# Patient Record
Sex: MALE | Race: WHITE | NOT HISPANIC OR LATINO
[De-identification: names, ages, dates, MRNs, and addresses within clinical notes are randomized per-mention and may not be internally consistent; named-entity substitution may affect disease eponyms.]

---

## 2018-11-28 ENCOUNTER — APPOINTMENT (OUTPATIENT)
Dept: GASTROENTEROLOGY | Facility: CLINIC | Age: 73
End: 2018-11-28
Payer: MEDICARE

## 2018-11-28 VITALS
DIASTOLIC BLOOD PRESSURE: 80 MMHG | TEMPERATURE: 98.5 F | HEART RATE: 68 BPM | WEIGHT: 220 LBS | OXYGEN SATURATION: 97 % | HEIGHT: 72 IN | BODY MASS INDEX: 29.8 KG/M2 | SYSTOLIC BLOOD PRESSURE: 160 MMHG

## 2018-11-28 DIAGNOSIS — Z86.79 PERSONAL HISTORY OF OTHER DISEASES OF THE CIRCULATORY SYSTEM: ICD-10-CM

## 2018-11-28 DIAGNOSIS — Z78.9 OTHER SPECIFIED HEALTH STATUS: ICD-10-CM

## 2018-11-28 DIAGNOSIS — Z82.49 FAMILY HISTORY OF ISCHEMIC HEART DISEASE AND OTHER DISEASES OF THE CIRCULATORY SYSTEM: ICD-10-CM

## 2018-11-28 DIAGNOSIS — Z12.11 ENCOUNTER FOR SCREENING FOR MALIGNANT NEOPLASM OF COLON: ICD-10-CM

## 2018-11-28 DIAGNOSIS — Z80.9 FAMILY HISTORY OF MALIGNANT NEOPLASM, UNSPECIFIED: ICD-10-CM

## 2018-11-28 PROCEDURE — 99204 OFFICE O/P NEW MOD 45 MIN: CPT

## 2018-11-28 RX ORDER — LISINOPRIL 40 MG/1
40 TABLET ORAL
Refills: 0 | Status: ACTIVE | COMMUNITY

## 2018-11-28 RX ORDER — AMLODIPINE BESYLATE 10 MG/1
10 TABLET ORAL
Refills: 0 | Status: ACTIVE | COMMUNITY

## 2018-12-03 ENCOUNTER — FORM ENCOUNTER (OUTPATIENT)
Age: 73
End: 2018-12-03

## 2018-12-04 ENCOUNTER — OUTPATIENT (OUTPATIENT)
Dept: OUTPATIENT SERVICES | Facility: HOSPITAL | Age: 73
LOS: 1 days | End: 2018-12-04
Payer: MEDICARE

## 2018-12-04 ENCOUNTER — APPOINTMENT (OUTPATIENT)
Dept: ULTRASOUND IMAGING | Facility: CLINIC | Age: 73
End: 2018-12-04
Payer: MEDICARE

## 2018-12-04 DIAGNOSIS — Z00.8 ENCOUNTER FOR OTHER GENERAL EXAMINATION: ICD-10-CM

## 2018-12-04 DIAGNOSIS — E83.119 HEMOCHROMATOSIS, UNSPECIFIED: ICD-10-CM

## 2018-12-04 PROCEDURE — 76700 US EXAM ABDOM COMPLETE: CPT | Mod: 26

## 2018-12-04 PROCEDURE — 76700 US EXAM ABDOM COMPLETE: CPT

## 2018-12-12 LAB — HEMOCCULT STL QL IA: NEGATIVE

## 2019-01-10 ENCOUNTER — APPOINTMENT (OUTPATIENT)
Dept: GASTROENTEROLOGY | Facility: AMBULATORY MEDICAL SERVICES | Age: 74
End: 2019-01-10
Payer: MEDICARE

## 2019-01-10 PROCEDURE — 45385 COLONOSCOPY W/LESION REMOVAL: CPT | Mod: PT

## 2019-02-11 ENCOUNTER — APPOINTMENT (OUTPATIENT)
Dept: GASTROENTEROLOGY | Facility: CLINIC | Age: 74
End: 2019-02-11
Payer: MEDICARE

## 2019-02-11 VITALS
BODY MASS INDEX: 30.07 KG/M2 | HEIGHT: 72 IN | DIASTOLIC BLOOD PRESSURE: 85 MMHG | SYSTOLIC BLOOD PRESSURE: 125 MMHG | OXYGEN SATURATION: 98 % | HEART RATE: 70 BPM | TEMPERATURE: 96.2 F | WEIGHT: 222 LBS

## 2019-02-11 DIAGNOSIS — Z09 ENCOUNTER FOR FOLLOW-UP EXAMINATION AFTER COMPLETED TREATMENT FOR CONDITIONS OTHER THAN MALIGNANT NEOPLASM: ICD-10-CM

## 2019-02-11 PROCEDURE — 99213 OFFICE O/P EST LOW 20 MIN: CPT

## 2019-02-11 NOTE — ASSESSMENT
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 He is currently asymptomatic. He will be seen in one year for followup visit. His next colonoscopy will be in 5 years

## 2019-02-11 NOTE — HISTORY OF PRESENT ILLNESS
[FreeTextEntry1] : Patient had a recent colonoscopy and polypectomy. The polyp in the transverse colon was a tubular adenoma. He also had diverticulosis in the sigmoid and left colon.\par The patient is currently asymptomatic from a GI standpoint.

## 2019-09-02 PROBLEM — Z09 FOLLOW UP: Status: ACTIVE | Noted: 2019-02-11

## 2020-03-16 ENCOUNTER — APPOINTMENT (OUTPATIENT)
Dept: GASTROENTEROLOGY | Facility: CLINIC | Age: 75
End: 2020-03-16

## 2020-07-29 ENCOUNTER — APPOINTMENT (OUTPATIENT)
Dept: GASTROENTEROLOGY | Facility: CLINIC | Age: 75
End: 2020-07-29
Payer: MEDICARE

## 2020-07-29 VITALS
OXYGEN SATURATION: 98 % | WEIGHT: 218 LBS | TEMPERATURE: 98.2 F | SYSTOLIC BLOOD PRESSURE: 140 MMHG | HEART RATE: 71 BPM | HEIGHT: 72 IN | BODY MASS INDEX: 29.53 KG/M2 | DIASTOLIC BLOOD PRESSURE: 80 MMHG

## 2020-07-29 PROCEDURE — 99213 OFFICE O/P EST LOW 20 MIN: CPT

## 2020-07-29 NOTE — PHYSICAL EXAM
[General Appearance - Alert] : alert [General Appearance - In No Acute Distress] : in no acute distress [Sclera] : the sclera and conjunctiva were normal [Extraocular Movements] : extraocular movements were intact [PERRL With Normal Accommodation] : pupils were equal in size, round, and reactive to light [Outer Ear] : the ears and nose were normal in appearance [Oropharynx] : the oropharynx was normal [Neck Appearance] : the appearance of the neck was normal [Thyroid Diffuse Enlargement] : the thyroid was not enlarged [Jugular Venous Distention Increased] : there was no jugular-venous distention [Neck Cervical Mass (___cm)] : no neck mass was observed [Thyroid Nodule] : there were no palpable thyroid nodules [Auscultation Breath Sounds / Voice Sounds] : lungs were clear to auscultation bilaterally [Heart Rate And Rhythm] : heart rate was normal and rhythm regular [Heart Sounds] : normal S1 and S2 [Heart Sounds Gallop] : no gallops [Murmurs] : no murmurs [Heart Sounds Pericardial Friction Rub] : no pericardial rub [Abdomen Soft] : soft [Bowel Sounds] : normal bowel sounds [Abdomen Tenderness] : non-tender [] : no hepato-splenomegaly [Abdomen Mass (___ Cm)] : no abdominal mass palpated [No Spinal Tenderness] : no spinal tenderness [No CVA Tenderness] : no ~M costovertebral angle tenderness [Abnormal Walk] : normal gait [Motor Tone] : muscle strength and tone were normal [Musculoskeletal - Swelling] : no joint swelling seen [Nail Clubbing] : no clubbing  or cyanosis of the fingernails [Oriented To Time, Place, And Person] : oriented to person, place, and time [Impaired Insight] : insight and judgment were intact [Affect] : the affect was normal

## 2020-07-29 NOTE — ASSESSMENT
[FreeTextEntry1] : Patient with history of tubular adenoma removed from the colon.  His bowel movements are regular.  He has no upper gastrointestinal symptoms.  FOBT will be sent to the lab.

## 2020-07-29 NOTE — HISTORY OF PRESENT ILLNESS
[FreeTextEntry1] : Patient is a 75 y/o male who had a  colonoscopy and polypectomy 1/2019. The polyp in the transverse colon was a tubular adenoma. He also had diverticulosis in the sigmoid and left colon.\par The patient is currently asymptomatic from a GI standpoint.

## 2020-08-04 LAB — HEMOCCULT STL QL IA: NEGATIVE

## 2020-12-16 PROBLEM — Z12.11 ENCOUNTER FOR SCREENING COLONOSCOPY: Status: RESOLVED | Noted: 2018-11-28 | Resolved: 2020-12-16

## 2021-12-22 ENCOUNTER — APPOINTMENT (OUTPATIENT)
Dept: GASTROENTEROLOGY | Facility: CLINIC | Age: 76
End: 2021-12-22
Payer: MEDICARE

## 2021-12-22 VITALS
DIASTOLIC BLOOD PRESSURE: 90 MMHG | WEIGHT: 218 LBS | BODY MASS INDEX: 29.53 KG/M2 | TEMPERATURE: 97.3 F | HEART RATE: 59 BPM | SYSTOLIC BLOOD PRESSURE: 183 MMHG | HEIGHT: 72 IN | OXYGEN SATURATION: 96 %

## 2021-12-22 PROCEDURE — 99214 OFFICE O/P EST MOD 30 MIN: CPT

## 2021-12-22 NOTE — REVIEW OF SYSTEMS
[As Noted in HPI] : as noted in HPI [Negative] : Endocrine [de-identified] : Hx of hemochromatosis and polycythemia

## 2021-12-22 NOTE — ASSESSMENT
[FreeTextEntry1] : Patient with history of CAD and polycythemia and hemochromatosis.  He does have phlebotomies every 6 months.\par \par Patient has a history of a tubular adenoma of the colon removed 2 years ago.  He did have a bout of flatulence and some irregular bowel movements 3 weeks ago that was self-limiting.  The symptoms have resolved and he is back to his baseline bowel movements.  He has no abdominal pain.  FOBT will be sent to the lab.\par \par If he continues to do well, we will see him in follow-up in 1 year.

## 2021-12-22 NOTE — HISTORY OF PRESENT ILLNESS
[FreeTextEntry1] : Patient is a 76 y/o male who had a  colonoscopy and polypectomy 1/2019. The polyp in the transverse colon was a tubular adenoma. He also had diverticulosis in the sigmoid and left colon.\par About 3 weeks ago patient had a bout of flatulence and passing much flatus.  This has now resolved and patient is back to his baseline with normal bowel movements.\par He does have a history of hemochromatosis and goes for phlebotomies every 6 months.

## 2021-12-28 ENCOUNTER — NON-APPOINTMENT (OUTPATIENT)
Age: 76
End: 2021-12-28

## 2021-12-28 LAB — HEMOCCULT STL QL IA: NEGATIVE

## 2023-09-08 ENCOUNTER — OFFICE (OUTPATIENT)
Dept: URBAN - METROPOLITAN AREA CLINIC 27 | Facility: CLINIC | Age: 78
Setting detail: OPHTHALMOLOGY
End: 2023-09-08
Payer: MEDICARE

## 2023-09-08 DIAGNOSIS — H02.831: ICD-10-CM

## 2023-09-08 DIAGNOSIS — H43.811: ICD-10-CM

## 2023-09-08 DIAGNOSIS — H25.13: ICD-10-CM

## 2023-09-08 DIAGNOSIS — H01.001: ICD-10-CM

## 2023-09-08 DIAGNOSIS — H52.4: ICD-10-CM

## 2023-09-08 DIAGNOSIS — E11.9: ICD-10-CM

## 2023-09-08 DIAGNOSIS — H52.7: ICD-10-CM

## 2023-09-08 PROBLEM — H10.45 ALLERGIC CONJUNCTIVITIS: Status: ACTIVE | Noted: 2023-09-08

## 2023-09-08 PROCEDURE — 92015 DETERMINE REFRACTIVE STATE: CPT | Performed by: OPHTHALMOLOGY

## 2023-09-08 PROCEDURE — 92014 COMPRE OPH EXAM EST PT 1/>: CPT | Performed by: OPHTHALMOLOGY

## 2023-09-08 ASSESSMENT — LID POSITION - DERMATOCHALASIS
OS_DERMATOCHALASIS: LUL 2+
OD_DERMATOCHALASIS: RUL 2+ 3+

## 2023-09-08 ASSESSMENT — REFRACTION_CURRENTRX
OD_SPHERE: +2.75
OS_AXIS: 003
OS_OVR_VA: 20/
OS_VPRISM_DIRECTION: SV
OD_OVR_VA: 20/
OD_VPRISM_DIRECTION: SV
OD_VPRISM_DIRECTION: SV
OD_SPHERE: +1.00
OS_OVR_VA: 20/
OD_OVR_VA: 20/
OS_CYLINDER: +0.75
OS_SPHERE: +2.75
OD_AXIS: 008
OS_SPHERE: +0.75
OS_VPRISM_DIRECTION: SV
OD_CYLINDER: +0.75

## 2023-09-08 ASSESSMENT — REFRACTION_MANIFEST
OD_AXIS: 180
OS_AXIS: 180
OD_AXIS: 180
OD_ADD: +2.50
OD_ADD: +2.50
OS_SPHERE: +0.75
OS_CYLINDER: +0.50
OS_SPHERE: +0.75
OS_ADD: +2.50
OS_ADD: +2.50
OS_AXIS: 015
OD_VA1: 20/20
OS_CYLINDER: +0.50
OD_CYLINDER: +0.75
OD_AXIS: 175
OD_ADD: +2.50
OD_VA1: 20/20
OS_AXIS: 005
OS_VA1: 20/20
OD_CYLINDER: +0.50
OS_VA1: 20/20
OS_SPHERE: +0.75
OD_VA1: 20/20
OS_CYLINDER: +0.75
OD_CYLINDER: +0.75
OD_SPHERE: +1.00
OS_ADD: +2.50
OD_SPHERE: +0.75
OS_VA1: 20/20
OD_SPHERE: +1.00

## 2023-09-08 ASSESSMENT — KERATOMETRY
OS_AXISANGLE_DEGREES: 180
METHOD_AUTO_MANUAL: AUTO
OS_K2POWER_DIOPTERS: 43.00
OD_AXISANGLE_DEGREES: 171
OD_K2POWER_DIOPTERS: 42.25
OD_K1POWER_DIOPTERS: 42.00
OS_K1POWER_DIOPTERS: 42.00

## 2023-09-08 ASSESSMENT — LID EXAM ASSESSMENTS
OS_BLEPHARITIS: LLL LUL 2+
OD_BLEPHARITIS: RLL RUL 2+
OD_MEIBOMITIS: RUL 2+
OS_MEIBOMITIS: LUL 2+

## 2023-09-08 ASSESSMENT — SPHEQUIV_DERIVED
OD_SPHEQUIV: 1.375
OS_SPHEQUIV: 1
OD_SPHEQUIV: 1
OD_SPHEQUIV: 1.375
OS_SPHEQUIV: 1.375
OS_SPHEQUIV: 1.125
OD_SPHEQUIV: 1.375
OS_SPHEQUIV: 1

## 2023-09-08 ASSESSMENT — VISUAL ACUITY
OD_BCVA: 20/30
OS_BCVA: 20/20-2

## 2023-09-08 ASSESSMENT — AXIALLENGTH_DERIVED
OS_AL: 23.4247
OS_AL: 23.5211
OD_AL: 23.5613
OD_AL: 23.7079
OS_AL: 23.5696
OD_AL: 23.5613
OS_AL: 23.5696
OD_AL: 23.5613

## 2023-09-08 ASSESSMENT — REFRACTION_AUTOREFRACTION
OS_CYLINDER: +0.75
OD_AXIS: 178
OS_AXIS: 177
OS_SPHERE: +1.00
OD_SPHERE: +1.00
OD_CYLINDER: +0.75

## 2023-09-08 ASSESSMENT — TONOMETRY
OD_IOP_MMHG: 12
OS_IOP_MMHG: 12

## 2023-09-08 ASSESSMENT — CONFRONTATIONAL VISUAL FIELD TEST (CVF)
OS_FINDINGS: FULL
OD_FINDINGS: FULL

## 2023-09-20 ENCOUNTER — APPOINTMENT (OUTPATIENT)
Dept: GASTROENTEROLOGY | Facility: CLINIC | Age: 78
End: 2023-09-20
Payer: MEDICARE

## 2023-09-20 VITALS
DIASTOLIC BLOOD PRESSURE: 73 MMHG | HEIGHT: 72 IN | BODY MASS INDEX: 29.12 KG/M2 | TEMPERATURE: 98 F | WEIGHT: 215 LBS | SYSTOLIC BLOOD PRESSURE: 182 MMHG | HEART RATE: 53 BPM | OXYGEN SATURATION: 96 %

## 2023-09-20 DIAGNOSIS — I25.10 ATHEROSCLEROTIC HEART DISEASE OF NATIVE CORONARY ARTERY W/OUT ANGINA PECTORIS: ICD-10-CM

## 2023-09-20 DIAGNOSIS — D75.1 SECONDARY POLYCYTHEMIA: ICD-10-CM

## 2023-09-20 PROCEDURE — 99214 OFFICE O/P EST MOD 30 MIN: CPT

## 2023-09-20 RX ORDER — METOPROLOL SUCCINATE 50 MG/1
50 TABLET, EXTENDED RELEASE ORAL
Refills: 0 | Status: ACTIVE | COMMUNITY

## 2023-09-20 RX ORDER — METFORMIN HYDROCHLORIDE 500 MG/1
500 TABLET, COATED ORAL
Refills: 0 | Status: DISCONTINUED | COMMUNITY
End: 2023-09-20

## 2023-12-08 RX ORDER — ASPIRIN 325 MG/1
325 TABLET, FILM COATED ORAL
Refills: 0 | Status: DISCONTINUED | COMMUNITY
End: 2023-12-08

## 2024-02-22 ENCOUNTER — APPOINTMENT (OUTPATIENT)
Dept: GASTROENTEROLOGY | Facility: AMBULATORY MEDICAL SERVICES | Age: 79
End: 2024-02-22
Payer: MEDICARE

## 2024-02-22 PROCEDURE — 45385 COLONOSCOPY W/LESION REMOVAL: CPT | Mod: PT

## 2024-03-11 ENCOUNTER — APPOINTMENT (OUTPATIENT)
Dept: GASTROENTEROLOGY | Facility: CLINIC | Age: 79
End: 2024-03-11
Payer: MEDICARE

## 2024-03-11 VITALS
BODY MASS INDEX: 30.44 KG/M2 | TEMPERATURE: 97.3 F | WEIGHT: 215 LBS | HEART RATE: 57 BPM | HEIGHT: 70.5 IN | OXYGEN SATURATION: 96 % | DIASTOLIC BLOOD PRESSURE: 82 MMHG | SYSTOLIC BLOOD PRESSURE: 166 MMHG

## 2024-03-11 DIAGNOSIS — D12.6 BENIGN NEOPLASM OF COLON, UNSPECIFIED: ICD-10-CM

## 2024-03-11 DIAGNOSIS — Z86.010 PERSONAL HISTORY OF COLONIC POLYPS: ICD-10-CM

## 2024-03-11 DIAGNOSIS — E83.119 HEMOCHROMATOSIS, UNSPECIFIED: ICD-10-CM

## 2024-03-11 PROCEDURE — 99213 OFFICE O/P EST LOW 20 MIN: CPT

## 2024-03-11 RX ORDER — METFORMIN HYDROCHLORIDE 500 MG/1
500 TABLET, COATED ORAL
Refills: 0 | Status: ACTIVE | COMMUNITY

## 2024-03-11 RX ORDER — SODIUM SULFATE, POTASSIUM SULFATE AND MAGNESIUM SULFATE 1.6; 3.13; 17.5 G/177ML; G/177ML; G/177ML
17.5-3.13-1.6 SOLUTION ORAL
Qty: 1 | Refills: 0 | Status: DISCONTINUED | COMMUNITY
Start: 2018-11-28 | End: 2024-03-11

## 2024-03-11 NOTE — PHYSICAL EXAM
[Alert] : alert [Normal Voice/Communication] : normal voice/communication [Healthy Appearing] : healthy appearing [No Acute Distress] : no acute distress [Hearing Threshold Finger Rub Not [place]] : hearing was normal [Sclera] : the sclera and conjunctiva were normal [Normal Lips/Gums] : the lips and gums were normal [Oropharynx] : the oropharynx was normal [Normal Appearance] : the appearance of the neck was normal [No Neck Mass] : no neck mass was observed [No Respiratory Distress] : no respiratory distress [No Acc Muscle Use] : no accessory muscle use [Auscultation Breath Sounds / Voice Sounds] : lungs were clear to auscultation bilaterally [Respiration, Rhythm And Depth] : normal respiratory rhythm and effort [Heart Rate And Rhythm] : heart rate was normal and rhythm regular [Normal S1, S2] : normal S1 and S2 [Murmurs] : no murmurs [Abdomen Tenderness] : non-tender [Bowel Sounds] : normal bowel sounds [No Masses] : no abdominal mass palpated [] : no hepatosplenomegaly [Abdomen Soft] : soft [Oriented To Time, Place, And Person] : oriented to person, place, and time

## 2024-03-11 NOTE — REVIEW OF SYSTEMS
[As Noted in HPI] : as noted in HPI [Negative] : Psychiatric [FreeTextEntry5] : CAD [de-identified] : Hemochromatosis

## 2024-03-11 NOTE — ASSESSMENT
[FreeTextEntry1] : Patient is status post a colonoscopy.  He had a tubular adenoma in the right colon.  This was removed.  Cautery site was free of adenomatous cells.

## 2024-03-11 NOTE — HISTORY OF PRESENT ILLNESS
[FreeTextEntry1] : Patient is a 77 y/o male who had a  colonoscopy and polypectomy 1/2019. The polyp in the transverse colon was a tubular adenoma. He also had diverticulosis in the sigmoid and left colon.  His bowel movements are regular.  He has no abdominal pain.  He does not see any blood or mucus in the stool.  He does have a history of hemochromatosis and goes for phlebotomies every 6 months. Patient also has a history of coronary artery disease.  He is on no anticoagulant at this time.  They were removed because of frequent bruising on his arms.  3/11/2024-patient underwent a colonoscopy on 2/22/2024.  A polyp was removed from the ascending colon.  This was a tubular adenoma.  Margins were clear.  He has no complaints.  His bowel movements are regular.  He has no abdominal pain.

## 2024-11-11 ENCOUNTER — OFFICE (OUTPATIENT)
Dept: URBAN - METROPOLITAN AREA CLINIC 27 | Facility: CLINIC | Age: 79
Setting detail: OPHTHALMOLOGY
End: 2024-11-11
Payer: MEDICARE

## 2024-11-11 DIAGNOSIS — H43.811: ICD-10-CM

## 2024-11-11 DIAGNOSIS — H02.831: ICD-10-CM

## 2024-11-11 DIAGNOSIS — H25.13: ICD-10-CM

## 2024-11-11 DIAGNOSIS — H52.4: ICD-10-CM

## 2024-11-11 DIAGNOSIS — E11.9: ICD-10-CM

## 2024-11-11 DIAGNOSIS — H10.45: ICD-10-CM

## 2024-11-11 DIAGNOSIS — H01.001: ICD-10-CM

## 2024-11-11 PROCEDURE — 92015 DETERMINE REFRACTIVE STATE: CPT | Performed by: OPHTHALMOLOGY

## 2024-11-11 PROCEDURE — 92014 COMPRE OPH EXAM EST PT 1/>: CPT | Performed by: OPHTHALMOLOGY

## 2024-11-11 ASSESSMENT — REFRACTION_CURRENTRX
OD_OVR_VA: 20/
OS_SPHERE: +0.75
OD_AXIS: 008
OD_CYLINDER: +0.50
OD_AXIS: 173
OS_VPRISM_DIRECTION: SV
OD_SPHERE: +2.75
OD_OVR_VA: 20/
OS_VPRISM_DIRECTION: PROGS
OD_SPHERE: +0.75
OS_AXIS: 001
OS_OVR_VA: 20/
OD_VPRISM_DIRECTION: SV
OD_OVR_VA: 20/
OS_AXIS: 003
OS_OVR_VA: 20/
OS_VPRISM_DIRECTION: SV
OS_CYLINDER: +0.75
OS_OVR_VA: 20/
OS_CYLINDER: +0.50
OD_VPRISM_DIRECTION: SV
OD_CYLINDER: +0.75
OS_SPHERE: +2.75
OD_SPHERE: +1.00
OS_SPHERE: +0.75
OD_VPRISM_DIRECTION: PROGS

## 2024-11-11 ASSESSMENT — REFRACTION_MANIFEST
OS_SPHERE: +0.75
OS_VA1: 20/20
OS_CYLINDER: +0.50
OS_AXIS: 005
OD_VA1: 20/20
OS_ADD: +2.50
OS_SPHERE: +0.75
OS_VA1: 20/20
OS_ADD: +2.50
OD_SPHERE: +1.00
OD_VA1: 20/20
OS_AXIS: 180
OD_VA1: 20/20
OS_CYLINDER: +0.50
OS_AXIS: 015
OS_SPHERE: +0.75
OS_ADD: +2.50
OD_AXIS: 175
OS_VA1: 20/20
OD_AXIS: 180
OS_VA1: 20/20
OS_AXIS: 005
OS_CYLINDER: +0.75
OD_SPHERE: +1.00
OS_ADD: +2.50
OD_CYLINDER: +0.50
OD_AXIS: 180
OD_ADD: +2.50
OD_VA1: 20/20
OD_SPHERE: +1.00
OS_SPHERE: +0.75
OD_ADD: +2.50
OD_ADD: +2.50
OD_CYLINDER: +0.75
OS_CYLINDER: +0.50
OD_CYLINDER: +0.75
OD_SPHERE: +0.75
OD_AXIS: 180
OD_CYLINDER: +0.50
OD_ADD: +2.50

## 2024-11-11 ASSESSMENT — LID POSITION - DERMATOCHALASIS
OD_DERMATOCHALASIS: RUL 2+ 3+
OS_DERMATOCHALASIS: LUL 2+

## 2024-11-11 ASSESSMENT — REFRACTION_AUTOREFRACTION
OD_SPHERE: +1.25
OD_AXIS: 002
OS_CYLINDER: +0.50
OD_CYLINDER: +0.75
OS_SPHERE: +1.00
OS_AXIS: 14

## 2024-11-11 ASSESSMENT — CONFRONTATIONAL VISUAL FIELD TEST (CVF)
OD_FINDINGS: FULL
OS_FINDINGS: FULL

## 2024-11-11 ASSESSMENT — KERATOMETRY
OS_K1POWER_DIOPTERS: 42.50
OD_AXISANGLE_DEGREES: 98
METHOD_AUTO_MANUAL: AUTO
OS_AXISANGLE_DEGREES: 47
OD_K2POWER_DIOPTERS: 42.00
OD_K1POWER_DIOPTERS: 41.75
OS_K2POWER_DIOPTERS: 43.00

## 2024-11-11 ASSESSMENT — VISUAL ACUITY
OS_BCVA: 20/20
OD_BCVA: 20/25-1

## 2024-11-11 ASSESSMENT — LID EXAM ASSESSMENTS
OS_MEIBOMITIS: LUL 2+
OD_BLEPHARITIS: RLL RUL 2+
OS_BLEPHARITIS: LLL LUL 2+
OD_MEIBOMITIS: RUL 2+

## 2024-11-11 ASSESSMENT — TONOMETRY
OS_IOP_MMHG: 16
OD_IOP_MMHG: 20

## 2025-03-13 ENCOUNTER — APPOINTMENT (OUTPATIENT)
Dept: UROLOGY | Facility: CLINIC | Age: 80
End: 2025-03-13

## 2025-03-13 ENCOUNTER — NON-APPOINTMENT (OUTPATIENT)
Age: 80
End: 2025-03-13

## 2025-03-13 VITALS
HEIGHT: 70.5 IN | WEIGHT: 215 LBS | DIASTOLIC BLOOD PRESSURE: 79 MMHG | OXYGEN SATURATION: 96 % | BODY MASS INDEX: 30.44 KG/M2 | TEMPERATURE: 97.5 F | RESPIRATION RATE: 16 BRPM | SYSTOLIC BLOOD PRESSURE: 188 MMHG | HEART RATE: 64 BPM

## 2025-03-13 DIAGNOSIS — R97.20 ELEVATED PROSTATE, SPECIFIC ANTIGEN [PSA]: ICD-10-CM

## 2025-03-13 DIAGNOSIS — A49.9 URINARY TRACT INFECTION, SITE NOT SPECIFIED: ICD-10-CM

## 2025-03-13 DIAGNOSIS — N39.0 URINARY TRACT INFECTION, SITE NOT SPECIFIED: ICD-10-CM

## 2025-03-13 PROCEDURE — 99204 OFFICE O/P NEW MOD 45 MIN: CPT

## 2025-03-13 PROCEDURE — 99203 OFFICE O/P NEW LOW 30 MIN: CPT

## 2025-03-13 PROCEDURE — G2211 COMPLEX E/M VISIT ADD ON: CPT

## 2025-03-14 ENCOUNTER — NON-APPOINTMENT (OUTPATIENT)
Age: 80
End: 2025-03-14

## 2025-03-14 LAB
PSA FREE FLD-MCNC: 16 %
PSA FREE SERPL-MCNC: 0.89 NG/ML
PSA SERPL-MCNC: 5.44 NG/ML

## 2025-03-26 ENCOUNTER — RESULT REVIEW (OUTPATIENT)
Age: 80
End: 2025-03-26

## 2025-03-26 ENCOUNTER — APPOINTMENT (OUTPATIENT)
Dept: MRI IMAGING | Facility: IMAGING CENTER | Age: 80
End: 2025-03-26

## 2025-03-26 ENCOUNTER — OUTPATIENT (OUTPATIENT)
Dept: OUTPATIENT SERVICES | Facility: HOSPITAL | Age: 80
LOS: 1 days | End: 2025-03-26
Payer: COMMERCIAL

## 2025-03-26 DIAGNOSIS — R97.20 ELEVATED PROSTATE SPECIFIC ANTIGEN [PSA]: ICD-10-CM

## 2025-03-26 PROCEDURE — 72197 MRI PELVIS W/O & W/DYE: CPT

## 2025-03-26 PROCEDURE — 76498P: CUSTOM | Mod: 26

## 2025-03-26 PROCEDURE — A9585: CPT

## 2025-03-26 PROCEDURE — 72197 MRI PELVIS W/O & W/DYE: CPT | Mod: 26

## 2025-03-26 PROCEDURE — 76498 UNLISTED MR PROCEDURE: CPT

## 2025-03-27 ENCOUNTER — APPOINTMENT (OUTPATIENT)
Dept: UROLOGY | Facility: CLINIC | Age: 80
End: 2025-03-27

## 2025-04-03 DIAGNOSIS — R93.89 ABNORMAL FINDINGS ON DIAGNOSTIC IMAGING OF OTHER SPECIFIED BODY STRUCTURES: ICD-10-CM

## 2025-04-08 ENCOUNTER — NON-APPOINTMENT (OUTPATIENT)
Age: 80
End: 2025-04-08

## 2025-04-10 ENCOUNTER — OUTPATIENT (OUTPATIENT)
Dept: OUTPATIENT SERVICES | Facility: HOSPITAL | Age: 80
LOS: 1 days | End: 2025-04-10
Payer: COMMERCIAL

## 2025-04-10 DIAGNOSIS — R97.20 ELEVATED PROSTATE SPECIFIC ANTIGEN [PSA]: ICD-10-CM

## 2025-04-10 PROCEDURE — C8001: CPT

## 2025-04-17 ENCOUNTER — APPOINTMENT (OUTPATIENT)
Dept: UROLOGY | Facility: CLINIC | Age: 80
End: 2025-04-17

## 2025-04-17 ENCOUNTER — OUTPATIENT (OUTPATIENT)
Dept: OUTPATIENT SERVICES | Facility: HOSPITAL | Age: 80
LOS: 1 days | End: 2025-04-17
Payer: COMMERCIAL

## 2025-04-17 VITALS — DIASTOLIC BLOOD PRESSURE: 84 MMHG | SYSTOLIC BLOOD PRESSURE: 130 MMHG | HEART RATE: 60 BPM

## 2025-04-17 DIAGNOSIS — R97.20 ELEVATED PROSTATE, SPECIFIC ANTIGEN [PSA]: ICD-10-CM

## 2025-04-17 DIAGNOSIS — R35.0 FREQUENCY OF MICTURITION: ICD-10-CM

## 2025-04-17 PROCEDURE — 55700: CPT

## 2025-04-18 ENCOUNTER — NON-APPOINTMENT (OUTPATIENT)
Age: 80
End: 2025-04-18

## 2025-04-23 LAB — PROSTATE BIOPSY: NORMAL

## 2025-04-28 DIAGNOSIS — R97.20 ELEVATED PROSTATE SPECIFIC ANTIGEN [PSA]: ICD-10-CM

## 2025-04-30 DIAGNOSIS — C61 MALIGNANT NEOPLASM OF PROSTATE: ICD-10-CM

## 2025-05-05 ENCOUNTER — TRANSCRIPTION ENCOUNTER (OUTPATIENT)
Age: 80
End: 2025-05-05

## 2025-05-20 ENCOUNTER — APPOINTMENT (OUTPATIENT)
Dept: NUCLEAR MEDICINE | Facility: IMAGING CENTER | Age: 80
End: 2025-05-20
Payer: COMMERCIAL

## 2025-05-20 ENCOUNTER — OUTPATIENT (OUTPATIENT)
Dept: OUTPATIENT SERVICES | Facility: HOSPITAL | Age: 80
LOS: 1 days | End: 2025-05-20
Payer: COMMERCIAL

## 2025-05-20 DIAGNOSIS — C61 MALIGNANT NEOPLASM OF PROSTATE: ICD-10-CM

## 2025-05-20 PROCEDURE — 78816 PET IMAGE W/CT FULL BODY: CPT

## 2025-05-20 PROCEDURE — 78816 PET IMAGE W/CT FULL BODY: CPT | Mod: 26

## 2025-05-20 PROCEDURE — A9608: CPT

## 2025-05-23 ENCOUNTER — NON-APPOINTMENT (OUTPATIENT)
Age: 80
End: 2025-05-23

## 2025-05-29 ENCOUNTER — APPOINTMENT (OUTPATIENT)
Dept: UROLOGY | Facility: CLINIC | Age: 80
End: 2025-05-29
Payer: MEDICARE

## 2025-05-29 VITALS
WEIGHT: 216 LBS | OXYGEN SATURATION: 96 % | RESPIRATION RATE: 16 BRPM | BODY MASS INDEX: 30.58 KG/M2 | HEART RATE: 67 BPM | HEIGHT: 70.5 IN | SYSTOLIC BLOOD PRESSURE: 135 MMHG | DIASTOLIC BLOOD PRESSURE: 89 MMHG

## 2025-05-29 PROCEDURE — 99215 OFFICE O/P EST HI 40 MIN: CPT

## 2025-05-29 PROCEDURE — G2211 COMPLEX E/M VISIT ADD ON: CPT

## 2025-05-29 RX ORDER — BICALUTAMIDE 50 MG/1
50 TABLET ORAL DAILY
Qty: 30 | Refills: 0 | Status: ACTIVE | COMMUNITY
Start: 2025-05-29 | End: 1900-01-01

## 2025-06-04 ENCOUNTER — APPOINTMENT (OUTPATIENT)
Dept: RADIATION ONCOLOGY | Facility: CLINIC | Age: 80
End: 2025-06-04
Payer: COMMERCIAL

## 2025-06-04 VITALS
HEART RATE: 53 BPM | HEIGHT: 70.5 IN | SYSTOLIC BLOOD PRESSURE: 178 MMHG | WEIGHT: 215.34 LBS | DIASTOLIC BLOOD PRESSURE: 78 MMHG | BODY MASS INDEX: 30.49 KG/M2 | OXYGEN SATURATION: 93 % | TEMPERATURE: 97 F | RESPIRATION RATE: 16 BRPM

## 2025-06-04 PROCEDURE — 99205 OFFICE O/P NEW HI 60 MIN: CPT

## 2025-06-07 ENCOUNTER — OUTPATIENT (OUTPATIENT)
Dept: OUTPATIENT SERVICES | Facility: HOSPITAL | Age: 80
LOS: 1 days | Discharge: HOME | End: 2025-06-07

## 2025-06-07 DIAGNOSIS — C61 MALIGNANT NEOPLASM OF PROSTATE: ICD-10-CM

## 2025-06-09 ENCOUNTER — APPOINTMENT (OUTPATIENT)
Dept: HEMATOLOGY ONCOLOGY | Facility: CLINIC | Age: 80
End: 2025-06-09
Payer: COMMERCIAL

## 2025-06-09 VITALS
SYSTOLIC BLOOD PRESSURE: 189 MMHG | HEART RATE: 47 BPM | BODY MASS INDEX: 30.59 KG/M2 | DIASTOLIC BLOOD PRESSURE: 76 MMHG | HEIGHT: 70.67 IN | OXYGEN SATURATION: 99 % | RESPIRATION RATE: 15 BRPM | WEIGHT: 218.48 LBS | TEMPERATURE: 96.6 F

## 2025-06-09 PROCEDURE — 99204 OFFICE O/P NEW MOD 45 MIN: CPT

## 2025-06-17 ENCOUNTER — OUTPATIENT (OUTPATIENT)
Dept: OUTPATIENT SERVICES | Facility: HOSPITAL | Age: 80
LOS: 1 days | End: 2025-06-17
Payer: COMMERCIAL

## 2025-06-17 ENCOUNTER — APPOINTMENT (OUTPATIENT)
Dept: UROLOGY | Facility: CLINIC | Age: 80
End: 2025-06-17

## 2025-06-17 VITALS
RESPIRATION RATE: 16 BRPM | TEMPERATURE: 97.8 F | HEART RATE: 54 BPM | SYSTOLIC BLOOD PRESSURE: 168 MMHG | OXYGEN SATURATION: 100 % | DIASTOLIC BLOOD PRESSURE: 72 MMHG

## 2025-06-17 DIAGNOSIS — R35.0 FREQUENCY OF MICTURITION: ICD-10-CM

## 2025-06-17 PROCEDURE — 99214 OFFICE O/P EST MOD 30 MIN: CPT | Mod: 25

## 2025-06-17 PROCEDURE — 96402U: CUSTOM | Mod: NC

## 2025-06-17 PROCEDURE — 96402 CHEMO HORMON ANTINEOPL SQ/IM: CPT

## 2025-06-17 RX ORDER — LEUPROLIDE ACETATE 45 MG/.375ML
45 INJECTION, SUSPENSION, EXTENDED RELEASE SUBCUTANEOUS
Qty: 1 | Refills: 0 | Status: COMPLETED | OUTPATIENT
Start: 2025-06-17 | End: 2025-06-17

## 2025-06-17 RX ORDER — LEUPROLIDE ACETATE 45 MG/.375ML
45 INJECTION, SUSPENSION, EXTENDED RELEASE SUBCUTANEOUS
Refills: 0 | Status: COMPLETED | OUTPATIENT
Start: 2025-06-17

## 2025-06-17 RX ADMIN — LEUPROLIDE ACETATE 45 MG: 45 INJECTION, SUSPENSION, EXTENDED RELEASE SUBCUTANEOUS at 00:00

## 2025-06-30 DIAGNOSIS — C61 MALIGNANT NEOPLASM OF PROSTATE: ICD-10-CM
